# Patient Record
Sex: FEMALE | Race: WHITE
[De-identification: names, ages, dates, MRNs, and addresses within clinical notes are randomized per-mention and may not be internally consistent; named-entity substitution may affect disease eponyms.]

---

## 2018-05-21 ENCOUNTER — HOSPITAL ENCOUNTER (INPATIENT)
Dept: HOSPITAL 92 - L&D | Age: 28
LOS: 10 days | Discharge: HOME | End: 2018-05-31
Attending: OBSTETRICS & GYNECOLOGY | Admitting: OBSTETRICS & GYNECOLOGY
Payer: COMMERCIAL

## 2018-05-21 VITALS — BODY MASS INDEX: 23.7 KG/M2

## 2018-05-21 DIAGNOSIS — Z88.0: ICD-10-CM

## 2018-05-21 DIAGNOSIS — Z3A.41: ICD-10-CM

## 2018-05-21 DIAGNOSIS — O48.0: Primary | ICD-10-CM

## 2018-05-21 PROCEDURE — 86901 BLOOD TYPING SEROLOGIC RH(D): CPT

## 2018-05-21 PROCEDURE — 36415 COLL VENOUS BLD VENIPUNCTURE: CPT

## 2018-05-21 PROCEDURE — 90384 RH IG FULL-DOSE IM: CPT

## 2018-05-21 PROCEDURE — 86900 BLOOD TYPING SEROLOGIC ABO: CPT

## 2018-05-21 PROCEDURE — 86850 RBC ANTIBODY SCREEN: CPT

## 2018-05-21 PROCEDURE — 86780 TREPONEMA PALLIDUM: CPT

## 2018-05-21 PROCEDURE — 90715 TDAP VACCINE 7 YRS/> IM: CPT

## 2018-05-21 PROCEDURE — S0020 INJECTION, BUPIVICAINE HYDRO: HCPCS

## 2018-05-21 PROCEDURE — 51702 INSERT TEMP BLADDER CATH: CPT

## 2018-05-21 PROCEDURE — 85461 HEMOGLOBIN FETAL: CPT

## 2018-05-21 PROCEDURE — 86870 RBC ANTIBODY IDENTIFICATION: CPT

## 2018-05-21 PROCEDURE — 87340 HEPATITIS B SURFACE AG IA: CPT

## 2018-05-21 PROCEDURE — 96372 THER/PROPH/DIAG INJ SC/IM: CPT

## 2018-05-21 PROCEDURE — 85027 COMPLETE CBC AUTOMATED: CPT

## 2018-05-28 LAB
HBSAG INDEX: 0.21 S/CO (ref 0–0.99)
HGB BLD-MCNC: 12.5 G/DL (ref 12–16)
MCH RBC QN AUTO: 34.9 PG (ref 27–31)
MCV RBC AUTO: 97.7 FL (ref 81–99)
PLATELET # BLD AUTO: 276 THOU/UL (ref 130–400)
RBC # BLD AUTO: 3.58 MILL/UL (ref 4.2–5.4)
SYPHILIS ANTIBODY INDEX: 0.04 S/CO
WBC # BLD AUTO: 13.7 THOU/UL (ref 4.8–10.8)

## 2018-05-28 PROCEDURE — 3E0P7VZ INTRODUCTION OF HORMONE INTO FEMALE REPRODUCTIVE, VIA NATURAL OR ARTIFICIAL OPENING: ICD-10-PCS | Performed by: OBSTETRICS & GYNECOLOGY

## 2018-05-28 PROCEDURE — 0KQM0ZZ REPAIR PERINEUM MUSCLE, OPEN APPROACH: ICD-10-PCS | Performed by: OBSTETRICS & GYNECOLOGY

## 2018-05-28 PROCEDURE — 3E033VJ INTRODUCTION OF OTHER HORMONE INTO PERIPHERAL VEIN, PERCUTANEOUS APPROACH: ICD-10-PCS | Performed by: OBSTETRICS & GYNECOLOGY

## 2018-05-28 RX ADMIN — SODIUM CHLORIDE SCH: 0.9 INJECTION, SOLUTION INTRAVENOUS at 22:20

## 2018-05-28 NOTE — PDOC.LDHP
Labor and Delivery H&P


Chief complaint: scheduled induction


HPI: 





Pt is a 28 yo G1 @ 41 weeks who presents for scheduled IOl.


Current gestational age (weeks): 41


Due date: 18


Grav: 1


Para: 1


Current pregnancy complications: none


Abnormal US findings: No


Current medications: pre-mayuri vitamins


Previous surgical history: none


Allergies/Adverse Reactions: 


 Allergies











Allergy/AdvReac Type Severity Reaction Status Date / Time


 


amoxicillin Allergy Mild Rash Verified 18 21:44











Social history: none





- Physical Exam


Vital signs reviewed and normal: yes


General: resting


Heart: RRR


Lungs: CTAB


Abdomen: gravid


Extremeties: no edema


FHT: category 1





- Vaginal Exam


cm dilated: 1


Effacement: 75%


Station: -3





- OB Labs


Blood type: A


RH: negative


Antibody Screen: negative


HIV: negative


RPR: negative


HEPSAg: negative


1 hour GCT: negative


GBS: negative


Rubella: immune





- Assessment


L&D Assessment: medically indicated induction





- Plan


Plan: admit to L&D, labor augmentation if indicated, observation in L&D, 

informed consent obtained, anesthesia consult for pain management


-: 





G1@ 41 weeks, scheduled IOL for prolonged pregnancy.  FHT reassuring.  Plan for 

cytotec for cervical ripening.

## 2018-05-29 RX ADMIN — BUPIVACAINE HYDROCHLORIDE SCH MLS: 5 INJECTION, SOLUTION EPIDURAL; INTRACAUDAL at 05:59

## 2018-05-29 RX ADMIN — DOCUSATE CALCIUM SCH MG: 240 CAPSULE, LIQUID FILLED ORAL at 23:27

## 2018-05-29 RX ADMIN — BUPIVACAINE HYDROCHLORIDE SCH MLS: 5 INJECTION, SOLUTION EPIDURAL; INTRACAUDAL at 10:30

## 2018-05-29 RX ADMIN — SODIUM CHLORIDE SCH MLS: 0.9 INJECTION, SOLUTION INTRAVENOUS at 05:10

## 2018-05-29 NOTE — PDOC.OPDEL
OB Operative/Delivery Note


Delivery Dr/Surgeon: Marco Antonio


Pre-Delivery Diagnosis: medically indicated induction (41 weeks)


Procedure/Post Delivery Dx: spontaneous vaginal delivery


Weeks gestation: 41





- Findings


  ** A


Sex: female


Apgar - 1 min: 9


Apgar - 5 min: 9





- Additional Findings/Plan


Placenta delivered: spontaneous


Repaired Obstetrical Laceration: 2nd degree (and bilateral sulcal tears)


Estimated blood loss: 400ml


Post delivery plan: routine recovery

## 2018-05-29 NOTE — PDOC.LDPN
Labor & Delivery Progress Note





- Subjective


Subjective: comfortable





- Objective


Vital signs reviewed and normal: yes


General: resting


Uterine fundus: non tender


Dilation: 5


Effacement: 90%


Station: -1


FHT: category 1


[place] contractions every: 1-2





- Assessment


(1) 41 weeks gestation of pregnancy


Code(s): Z3A.41 - 41 WEEKS GESTATION OF PREGNANCY   Current Visit: Yes   Status

: Acute   


Plan: continue plan of care

## 2018-05-30 LAB
HGB BLD-MCNC: 10.4 G/DL (ref 12–16)
MCH RBC QN AUTO: 34 PG (ref 27–31)
MCV RBC AUTO: 99.4 FL (ref 81–99)
PLATELET # BLD AUTO: 206 THOU/UL (ref 130–400)
RBC # BLD AUTO: 3.06 MILL/UL (ref 4.2–5.4)
WBC # BLD AUTO: 18.6 THOU/UL (ref 4.8–10.8)

## 2018-05-30 RX ADMIN — DOCUSATE CALCIUM SCH MG: 240 CAPSULE, LIQUID FILLED ORAL at 09:50

## 2018-05-30 RX ADMIN — DOCUSATE CALCIUM SCH MG: 240 CAPSULE, LIQUID FILLED ORAL at 21:30

## 2018-05-30 NOTE — PDOC.PP
Post Partum Progress Note


Post Partum Day #: 1


Subjective: 





doing well, would like to dc today if baby dc'd.  No concerns.


PO intake tolerated: yes


Flatus: yes


Ambulation: yes


 Vital Signs (12 hours)











  Temp Pulse Resp BP


 


 05/30/18 08:00  98.3 F  65  18  117/57 L


 


 05/30/18 04:00  98.4 F  68  18  115/60


 


 05/30/18 00:00  98.5 F  82  18 








 Weight











Weight                         147 lb

















- Physical Examination


General: NAD


Respiratory: non-labored breathing


Abdominal: no distention


Fundus firm & at: below umb


Extremities: negative homans (B)


Skin: CS incision dry & intact, no rash


Neurological: no gross focal deficits


Psychiatric: A&Ox3, normal affect


Result Diagrams: 


 05/30/18 05:33





Additional Labs: 


 Post Partum Labs











Blood Type  A NEGATIVE   05/28/18  22:04    


 


Hep Bs Antigen  Non-Reactive S/CO (NonReactive)   05/28/18  22:04    











(1) 41 weeks gestation of pregnancy


Code(s): Z3A.41 - 41 WEEKS GESTATION OF PREGNANCY   Status: Acute   





- Assessment/Plan





PPD1 doing well, goals met, poss DC today.

## 2018-05-31 VITALS — TEMPERATURE: 98.4 F | SYSTOLIC BLOOD PRESSURE: 106 MMHG | DIASTOLIC BLOOD PRESSURE: 59 MMHG

## 2018-05-31 RX ADMIN — DOCUSATE CALCIUM SCH MG: 240 CAPSULE, LIQUID FILLED ORAL at 09:59

## 2018-05-31 NOTE — PDOC.PP
Post Partum Progress Note


Post Partum Day #: 2


PO intake tolerated: yes


Flatus: yes


Ambulation: yes


 Vital Signs (12 hours)











  Temp Pulse Resp BP


 


 18 08:00  98.4 F  72  18  106/59 L








 Weight











Weight                         147 lb

















- Physical Examination


General: NAD


Cardiovascular: RRR


Respiratory: non-labored breathing


Abdominal: no distention, appropriately TTP


Fundus firm & at: umb-2


Psychiatric: normal affect


Result Diagrams: 


 18 05:33





Additional Labs: 


 Post Partum Labs











Blood Type  A NEGATIVE   18  22:04    


 


Hep Bs Antigen  Non-Reactive S/CO (NonReactive)   18  22:04    











(1) Vaginal delivery


Code(s): O80 - ENCOUNTER FOR FULL-TERM UNCOMPLICATED DELIVERY   Status: Acute   





- Assessment/Plan


PPD2 s/p 


VSSAF


Doing well no issues.  Breastfeeding


Rh neg s/p rhogam, RImm


DC home FU 6 wk